# Patient Record
Sex: MALE | Race: WHITE
[De-identification: names, ages, dates, MRNs, and addresses within clinical notes are randomized per-mention and may not be internally consistent; named-entity substitution may affect disease eponyms.]

---

## 2017-07-26 ENCOUNTER — HOSPITAL ENCOUNTER (OUTPATIENT)
Dept: HOSPITAL 10 - HKI | Age: 52
Discharge: HOME | End: 2017-07-26
Attending: ORTHOPAEDIC SURGERY
Payer: COMMERCIAL

## 2017-07-26 DIAGNOSIS — M16.0: Primary | ICD-10-CM

## 2017-07-26 DIAGNOSIS — R20.0: ICD-10-CM

## 2017-07-26 DIAGNOSIS — E11.9: ICD-10-CM

## 2017-07-26 DIAGNOSIS — I10: ICD-10-CM

## 2017-07-26 DIAGNOSIS — E66.01: ICD-10-CM

## 2017-07-26 DIAGNOSIS — Z96.662: ICD-10-CM

## 2017-07-26 DIAGNOSIS — M54.5: ICD-10-CM

## 2017-07-26 PROCEDURE — 73522 X-RAY EXAM HIPS BI 3-4 VIEWS: CPT

## 2017-07-26 PROCEDURE — G0463 HOSPITAL OUTPT CLINIC VISIT: HCPCS

## 2017-07-26 NOTE — RADRPT
PROCEDURE:   XR Pelvis and Hips. 

 

CLINICAL INDICATION:   Pelvic pain.  Bilateral hip pain.  

 

TECHNIQUE:   Five views.  Frontal pelvis.  Frontal and lateral right hip.  Frontal and lateral left 
hip. 

 

COMPARISON:   No prior studies are available for comparison. 

 

FINDINGS:

There is no fracture or dislocation.

The soft tissues are normal.

There are degenerative changes of both hips with osteophytes, joint space narrowing, and subarticula
r sclerosis.  The upper pelvis is not included on the images.

There is no lytic or blastic lesion.

There is no radiopaque foreign body.  

 

IMPRESSION:

1.  Moderate degenerative changes of both hips.

2.  Upper pelvis not included on the images.

3.  Otherwise unremarkable study.  

 

RPTAT: QQ

_____________________________________________ 

.Edward Graham MD, MD           Date    Time 

Electronically viewed and signed by .Edward Graham MD, MD on 07/26/2017 16:16 

 

D:  07/26/2017 16:16  T:  07/26/2017 16:16

.R/
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
## 2017-08-02 ENCOUNTER — HOSPITAL ENCOUNTER (OUTPATIENT)
Dept: HOSPITAL 10 - HKI | Age: 52
Discharge: HOME | End: 2017-08-02
Attending: ORTHOPAEDIC SURGERY
Payer: COMMERCIAL

## 2017-08-02 DIAGNOSIS — M25.552: Primary | ICD-10-CM

## 2017-08-02 PROCEDURE — 20610 DRAIN/INJ JOINT/BURSA W/O US: CPT

## 2017-08-03 NOTE — HKNOTE
DATE OF SERVICE:  08/02/2017

 

 

 

The patient continues to complain of severe pain in his left hip.

He comes in with the MRI scan of his left hip for review.

 

 

The MRI scan, which was ordered for both hips, is reported only

on the right hip for some strange reason.  Dr. Lawrence reports

"there is moderate osteoarthritic change affecting the right hip

diffusely.  There are coexistent chronic osseous changes which

may contribute to a mixed type of femoral acetabular impingement.

There is grade 3-4 chondral loss affecting the weightbearing

portion of the hip.  There is a moderate joint effusion present.

There is diffuse acetabular degradation."

 

 

This report and these MRI pictures confirm that there is

arthritis in the left hip, although there is no clearcut bone-on-

bone classic type of arthritic change.

 

 

The patient's pain is mainly in his left hip, but Dr. Lawrence

only paid attention to the right hip.

 

 

I called Dr. Lawrence on his cell phone.  He was not available

but I have left a message for him to call me back after reviewing

the MRI pictures again and let me know what he thinks about the

left hip.

 

 

The patient is trying very hard to lose weight.  He feels that

might help his pain.  He works hard with a  3 times a

week.  He walks without support.  He has pain in the left hip

with every step that he takes.

 

 

 

DISCUSSION:  The patient is advised that in the absence of bone-

on-bone arthritic change, he probably will not be authorized to

proceed with a hip replacement operation at this time and he most

certainly should try a variety of conservative treatments.

 

 

He already is taking over-the-counter anti-inflammatory

medications.  He was asked to double up on his Advil dose.

 

 

Under sterile conditions, he was given an injection of 2 mL of

Kenalog and 6 mL of 2 percent lidocaine into the left hip joint.

He experienced immediate and complete relief of the pain in his

left hip.

 

 

 

 

 

Once I get Dr. Lawrence's report, I will give him a call and

further treatment will depend upon his response to the cortisone

injection.

 

 

 

 

 

 

 

Dictated By:  Tomas Monzon MD

 

 

 

/ryan/nida

 

Job#:  [id number]/Document#:  [id number]

 

D:  08/02/2017 22:10

 

T:  08/03/2017 04:03

## 2017-10-17 ENCOUNTER — HOSPITAL ENCOUNTER (OUTPATIENT)
Dept: HOSPITAL 10 - HKI | Age: 52
Discharge: HOME | End: 2017-10-17
Attending: ORTHOPAEDIC SURGERY
Payer: COMMERCIAL

## 2017-10-17 DIAGNOSIS — M16.12: Primary | ICD-10-CM

## 2017-10-17 DIAGNOSIS — Q74.2: ICD-10-CM

## 2017-10-17 PROCEDURE — G0463 HOSPITAL OUTPT CLINIC VISIT: HCPCS

## 2017-10-17 PROCEDURE — 20610 DRAIN/INJ JOINT/BURSA W/O US: CPT

## 2017-10-18 NOTE — HKNOTE
DATE OF SERVICE:  10/17/2017

 

 

The patient states that the cortisone injection given into his left hip at the last visit did not gi
ve him any relief.  He cannot even remember if he got any relief from the anesthetic when he left [facility] office.  

 

He states that the pain in the left hip has gotten worse and impacts on his life every day.  He is n
ow inclined to consider a hip replacement operation.

 

PHYSICAL EXAMINATION:

VITAL SIGNS:  Blood pressure 135/65, temperature 98.3.

LEFT HIP:  An excellent range of motion but quite severe pain in the left groin, especially on force
d internal and external rotation.

 

IMAGING:  Plain x-rays of his pelvis and hips were again reviewed today.  These show that there is f
airly good joint space left in both hips.  However, there are degenerative changes throughout.  The 
radiologist, Dr. Edward Graham reported on these same x-rays as showing "degenerative changes in both h
ips and osteophytes, joint space narrowing and subarticular sclerosis".

 

Not mentioned in the report is that both hips are dysplastic.

 

DISCUSSION:  The patient is a 51-year-old male with congenital dysplasia of both hips.  Symptoms of 
arthritis appearing only in the left hip.  Since there is no total bone-on-bone contact at any point
 in either hip, one is reluctant to recommend hip replacement surgery, but clearly there are degener
ative changes.  He is somewhat equivocal about whether or not the injection at the last visit was of
 any benefit.

 

By way of diagnostic tests, I gave him an injection of 5 mL of 2% lidocaine into the left hip joint 
today.  This gave him complete and total relief of all pain at all ranges of motion in the left hip.

 

DIAGNOSES:  

1.  Severe degenerative osteoarthritis of the left hip.  

2.  Congenital dysplasia of both hips.

 

Patient was advised that I will refer him to one of the surgeons in the group for consideration for 
hip replacement surgery.  I will contact him with the information.

 

 

Dictated By: MAC ANDRADE/SYLVIA

DD:    10/17/2017 14:47:31

DT:    10/18/2017 05:56:14

Conf#: [id number]

DID#:  [id number]

## 2017-12-13 ENCOUNTER — HOSPITAL ENCOUNTER (OUTPATIENT)
Dept: HOSPITAL 10 - HKI | Age: 52
Discharge: HOME | End: 2017-12-13
Payer: COMMERCIAL

## 2017-12-13 DIAGNOSIS — E11.9: ICD-10-CM

## 2017-12-13 DIAGNOSIS — M16.12: Primary | ICD-10-CM

## 2017-12-13 DIAGNOSIS — Z79.84: ICD-10-CM

## 2017-12-13 PROCEDURE — G0463 HOSPITAL OUTPT CLINIC VISIT: HCPCS
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
## 2018-01-24 ENCOUNTER — HOSPITAL ENCOUNTER (INPATIENT)
Dept: HOSPITAL 91 - REC | Age: 53
LOS: 1 days | Discharge: HOME HEALTH | DRG: 470 | End: 2018-01-25
Payer: COMMERCIAL

## 2018-01-24 ENCOUNTER — HOSPITAL ENCOUNTER (INPATIENT)
Age: 53
LOS: 1 days | Discharge: HOME HEALTH | DRG: 470 | End: 2018-01-25

## 2018-01-24 DIAGNOSIS — M16.12: Primary | ICD-10-CM

## 2018-01-24 PROCEDURE — 86900 BLOOD TYPING SEROLOGIC ABO: CPT

## 2018-01-24 PROCEDURE — 86901 BLOOD TYPING SEROLOGIC RH(D): CPT

## 2018-01-24 PROCEDURE — 80048 BASIC METABOLIC PNL TOTAL CA: CPT

## 2018-01-24 PROCEDURE — 0SRB04A REPLACEMENT OF LEFT HIP JOINT WITH CERAMIC ON POLYETHYLENE SYNTHETIC SUBSTITUTE, UNCEMENTED, OPEN APPROACH: ICD-10-PCS

## 2018-01-24 PROCEDURE — 97535 SELF CARE MNGMENT TRAINING: CPT

## 2018-01-24 PROCEDURE — 82962 GLUCOSE BLOOD TEST: CPT

## 2018-01-24 PROCEDURE — 97530 THERAPEUTIC ACTIVITIES: CPT

## 2018-01-24 PROCEDURE — 73530: CPT

## 2018-01-24 PROCEDURE — 97116 GAIT TRAINING THERAPY: CPT

## 2018-01-24 PROCEDURE — 97166 OT EVAL MOD COMPLEX 45 MIN: CPT

## 2018-01-24 PROCEDURE — 85025 COMPLETE CBC W/AUTO DIFF WBC: CPT

## 2018-01-24 PROCEDURE — 97163 PT EVAL HIGH COMPLEX 45 MIN: CPT

## 2018-01-24 PROCEDURE — 86850 RBC ANTIBODY SCREEN: CPT

## 2018-01-24 PROCEDURE — 87086 URINE CULTURE/COLONY COUNT: CPT

## 2018-01-24 PROCEDURE — 73500: CPT

## 2018-01-24 RX ADMIN — TRANEXAMIC ACID 1: 100 INJECTION, SOLUTION INTRAVENOUS at 09:48

## 2018-01-24 RX ADMIN — ASPIRIN 1 MG: 325 TABLET, DELAYED RELEASE ORAL at 11:04

## 2018-01-24 RX ADMIN — ONDANSETRON HYDROCHLORIDE 1 MG: 2 INJECTION, SOLUTION INTRAMUSCULAR; INTRAVENOUS at 11:11

## 2018-01-24 RX ADMIN — ONDANSETRON HYDROCHLORIDE 1 MG: 2 INJECTION, SOLUTION INTRAMUSCULAR; INTRAVENOUS at 17:49

## 2018-01-24 RX ADMIN — BACITRACIN 1 UNITS: 50000 INJECTION, POWDER, FOR SOLUTION INTRAMUSCULAR at 08:53

## 2018-01-24 RX ADMIN — VANCOMYCIN HYDROCHLORIDE 1 GM: 1 INJECTION, POWDER, LYOPHILIZED, FOR SOLUTION INTRAVENOUS at 08:53

## 2018-01-24 RX ADMIN — DIPHENHYDRAMINE HYDROCHLORIDE 1 MG: 50 INJECTION, SOLUTION INTRAMUSCULAR; INTRAVENOUS at 13:33

## 2018-01-24 RX ADMIN — THIAMINE HYDROCHLORIDE 1 MLS/HR: 100 INJECTION, SOLUTION INTRAMUSCULAR; INTRAVENOUS at 13:58

## 2018-01-24 RX ADMIN — THIAMINE HYDROCHLORIDE 1 MLS/HR: 100 INJECTION, SOLUTION INTRAMUSCULAR; INTRAVENOUS at 23:27

## 2018-01-24 RX ADMIN — POLYMYXIN B SULFATE 1 UNIT: 500000 INJECTION, POWDER, LYOPHILIZED, FOR SOLUTION INTRAMUSCULAR; INTRATHECAL; INTRAVENOUS; OPHTHALMIC at 08:53

## 2018-01-24 RX ADMIN — CEFAZOLIN 1 MLS/HR: 1 INJECTION, POWDER, FOR SOLUTION INTRAMUSCULAR; INTRAVENOUS at 12:30

## 2018-01-24 RX ADMIN — DOCUSATE SODIUM 1 MG: 100 CAPSULE, LIQUID FILLED ORAL at 11:05

## 2018-01-24 RX ADMIN — TRANEXAMIC ACID 1: 100 INJECTION, SOLUTION INTRAVENOUS at 07:40

## 2018-01-24 RX ADMIN — ASPIRIN 1 MG: 325 TABLET, DELAYED RELEASE ORAL at 20:26

## 2018-01-24 RX ADMIN — GABAPENTIN 1 MG: 300 CAPSULE ORAL at 20:25

## 2018-01-24 RX ADMIN — CEFAZOLIN 1 MLS/HR: 1 INJECTION, POWDER, FOR SOLUTION INTRAMUSCULAR; INTRAVENOUS at 20:25

## 2018-01-24 RX ADMIN — ONDANSETRON HYDROCHLORIDE 1 MG: 2 INJECTION, SOLUTION INTRAMUSCULAR; INTRAVENOUS at 23:24

## 2018-01-24 RX ADMIN — KETOROLAC TROMETHAMINE 1 ML: 30 INJECTION, SOLUTION INTRAMUSCULAR at 08:54

## 2018-01-25 LAB
ADD MAN DIFF?: NO
ANION GAP: 11 (ref 8–16)
BASOPHIL #: 0.1 10^3/UL (ref 0–0.1)
BASOPHILS %: 0.8 % (ref 0–2)
BLOOD UREA NITROGEN: 15 MG/DL (ref 7–20)
CALCIUM: 8.3 MG/DL (ref 8.4–10.2)
CARBON DIOXIDE: 29 MMOL/L (ref 21–31)
CHLORIDE: 102 MMOL/L (ref 97–110)
CREATININE: 1 MG/DL (ref 0.61–1.24)
EOSINOPHILS #: 0.7 10^3/UL (ref 0–0.5)
EOSINOPHILS %: 7.5 % (ref 0–7)
GLUCOSE: 105 MG/DL (ref 70–220)
HEMATOCRIT: 31.6 % (ref 42–52)
HEMOGLOBIN: 10.1 G/DL (ref 14–18)
LYMPHOCYTES #: 1.9 10^3/UL (ref 0.8–2.9)
LYMPHOCYTES %: 20.9 % (ref 15–51)
MEAN CORPUSCULAR HEMOGLOBIN: 27.4 PG (ref 29–33)
MEAN CORPUSCULAR HGB CONC: 32 G/DL (ref 32–37)
MEAN CORPUSCULAR VOLUME: 85.6 FL (ref 82–101)
MEAN PLATELET VOLUME: 10.7 FL (ref 7.4–10.4)
MONOCYTE #: 0.9 10^3/UL (ref 0.3–0.9)
MONOCYTES %: 10 % (ref 0–11)
NEUTROPHIL #: 5.4 10^3/UL (ref 1.6–7.5)
NEUTROPHILS %: 59.8 % (ref 39–77)
NUCLEATED RED BLOOD CELLS #: 0 10^3/UL (ref 0–0)
NUCLEATED RED BLOOD CELLS%: 0 /100WBC (ref 0–0)
PLATELET COUNT: 197 10^3/UL (ref 140–415)
POTASSIUM: 3.8 MMOL/L (ref 3.5–5.1)
RED BLOOD COUNT: 3.69 10^6/UL (ref 4.7–6.1)
RED CELL DISTRIBUTION WIDTH: 14.2 % (ref 11.5–14.5)
SODIUM: 138 MMOL/L (ref 135–144)
WHITE BLOOD COUNT: 9.1 10^3/UL (ref 4.8–10.8)

## 2018-01-25 RX ADMIN — AMLODIPINE BESYLATE 1 MG: 5 TABLET ORAL at 09:00

## 2018-01-25 RX ADMIN — CELECOXIB 1 MG: 200 CAPSULE ORAL at 09:29

## 2018-01-25 RX ADMIN — CELECOXIB 1 MG: 200 CAPSULE ORAL at 20:44

## 2018-01-25 RX ADMIN — PANTOPRAZOLE SODIUM 1 MG: 40 TABLET, DELAYED RELEASE ORAL at 05:45

## 2018-01-25 RX ADMIN — ONDANSETRON HYDROCHLORIDE 1 MG: 2 INJECTION, SOLUTION INTRAMUSCULAR; INTRAVENOUS at 05:45

## 2018-01-25 RX ADMIN — THIAMINE HYDROCHLORIDE 1 MLS/HR: 100 INJECTION, SOLUTION INTRAMUSCULAR; INTRAVENOUS at 11:20

## 2018-01-25 RX ADMIN — Medication 1 TAB: at 09:30

## 2018-01-25 RX ADMIN — ASPIRIN 1 MG: 325 TABLET, DELAYED RELEASE ORAL at 20:45

## 2018-01-25 RX ADMIN — FENOFIBRATE 1 MG: 145 TABLET, FILM COATED ORAL at 09:30

## 2018-01-25 RX ADMIN — CEFAZOLIN 1 MLS/HR: 1 INJECTION, POWDER, FOR SOLUTION INTRAMUSCULAR; INTRAVENOUS at 04:20

## 2018-01-25 RX ADMIN — Medication 1 TAB: at 20:44

## 2018-01-25 RX ADMIN — DOCUSATE SODIUM 1 MG: 100 CAPSULE, LIQUID FILLED ORAL at 09:30

## 2018-01-25 RX ADMIN — DOCUSATE SODIUM 1 MG: 100 CAPSULE, LIQUID FILLED ORAL at 20:44

## 2018-01-25 RX ADMIN — DULOXETINE HYDROCHLORIDE 1 MG: 30 CAPSULE, DELAYED RELEASE ORAL at 09:30

## 2018-01-25 RX ADMIN — GABAPENTIN 1 MG: 300 CAPSULE ORAL at 12:45

## 2018-01-25 RX ADMIN — ASPIRIN 1 MG: 325 TABLET, DELAYED RELEASE ORAL at 09:30

## 2018-01-25 RX ADMIN — GABAPENTIN 1 MG: 300 CAPSULE ORAL at 20:44

## 2018-01-25 RX ADMIN — GABAPENTIN 1 MG: 300 CAPSULE ORAL at 09:30

## 2018-02-08 ENCOUNTER — HOSPITAL ENCOUNTER (OUTPATIENT)
Dept: HOSPITAL 91 - HKI | Age: 53
Discharge: HOME | End: 2018-02-08
Payer: COMMERCIAL

## 2018-02-08 ENCOUNTER — HOSPITAL ENCOUNTER (OUTPATIENT)
Age: 53
Discharge: HOME | End: 2018-02-08

## 2018-02-08 DIAGNOSIS — Z47.1: Primary | ICD-10-CM

## 2018-02-08 DIAGNOSIS — Z96.642: ICD-10-CM

## 2018-02-14 ENCOUNTER — HOSPITAL ENCOUNTER (OUTPATIENT)
Age: 53
Discharge: HOME | End: 2018-02-14

## 2018-02-14 ENCOUNTER — HOSPITAL ENCOUNTER (OUTPATIENT)
Dept: HOSPITAL 91 - HKI | Age: 53
Discharge: HOME | End: 2018-02-14
Payer: COMMERCIAL

## 2018-02-14 DIAGNOSIS — Z96.642: ICD-10-CM

## 2018-02-14 DIAGNOSIS — Z47.1: Primary | ICD-10-CM

## 2018-02-14 DIAGNOSIS — M16.12: ICD-10-CM

## 2018-03-14 ENCOUNTER — HOSPITAL ENCOUNTER (OUTPATIENT)
Dept: HOSPITAL 91 - HKI | Age: 53
Discharge: HOME | End: 2018-03-14
Payer: COMMERCIAL

## 2018-03-14 ENCOUNTER — HOSPITAL ENCOUNTER (OUTPATIENT)
Age: 53
Discharge: HOME | End: 2018-03-14

## 2018-03-14 DIAGNOSIS — Z09: Primary | ICD-10-CM

## 2018-03-14 DIAGNOSIS — Z96.642: ICD-10-CM

## 2018-03-14 PROCEDURE — 73502 X-RAY EXAM HIP UNI 2-3 VIEWS: CPT
